# Patient Record
Sex: FEMALE | Race: WHITE | NOT HISPANIC OR LATINO | URBAN - METROPOLITAN AREA
[De-identification: names, ages, dates, MRNs, and addresses within clinical notes are randomized per-mention and may not be internally consistent; named-entity substitution may affect disease eponyms.]

---

## 2018-03-27 NOTE — PATIENT DISCUSSION
Monitor for now. Once cataract surgery is performed, will re-evaluate floater in left eye. follow with TPB.

## 2018-10-11 NOTE — PROCEDURE NOTE: SURGICAL
<p>Prior to commencing surgery patient identification, surgical procedure, site, and side were confirmed by Dr. Monserrat Lance. Following topical proparacaine anesthesia, the patient was positioned at the YAG laser, a contact lens coupled to the cornea with methylcellulose and an axial posterior capsulotomy performed without complication using 3.4 Mj x 25. Excess methylcellulose was washed from the eye, one drop of Alphagan was instilled and the patient returned to the holding area having tolerated the procedure well and without complication. </p><p>MRN:[id number]</p>

## 2018-10-22 NOTE — PATIENT DISCUSSION
Pt interested in vitreolysis regarding her visually significant vitreal opacities OS. Discuss with [facility] and retina.

## 2018-12-04 NOTE — PATIENT DISCUSSION
Telephone Encounter by Abdirizakefrain Garcia Zen VIEO at 07/06/18 10:24 AM     Author:  Rose Mary Garcia CMA Service:  (none) Author Type:  Certified Medical Assistant     Filed:  07/06/18 10:27 AM Encounter Date:  7/5/2018 Status:  Signed     :  Rose Mary Garcia Zen 9GAG Chuy (Certified Medical Assistant)            Med ordered previously by [HS1.1T] Neo[HS1.1M], will first route request to[HS1.1T] Pulmonary[HS1.1M] . Please let us know if order needed from PCP. Thanks. [HS1.1T]            Revision History        User Key Date/Time User Provider Type Action    > HS1.1 07/06/18 10:27 AM Rose Mary Garcia Zen VIEO Certified Medical Assistant Sign    M - Manual, T - Template The types of intraocular lenses were reviewed with the patient along with a discussion of their various strengths and weaknesses.

## 2019-06-06 NOTE — PATIENT DISCUSSION
Pt interested in vitreolysis. No adenopathy or splenomegaly. No cervical or inguinal lymphadenopathy.

## 2019-10-07 NOTE — PATIENT DISCUSSION
Reassurance given.  Inflammation not infection.  May persist for a long time.  Recommend frequent warm compresses and gtts.  If persists when returns from travels, she can return for other intervention.  Maxitrol QID OS x 2-3 weeks then stop.  (Tobradex and Lotemax $$)  Follow up prn.

## 2019-10-07 NOTE — PATIENT DISCUSSION
Pt discharged home with mother and grandmother. AVS gone over with pt and mother. Mother understands f/u needs. Will  rx at home pharmacy. All belongings sent with pt. IV discontinued.   consult [facility].

## 2021-02-03 NOTE — PATIENT DISCUSSION
Stable. Cimetidine Counseling:  I discussed with the patient the risks of Cimetidine including but not limited to gynecomastia, headache, diarrhea, nausea, drowsiness, arrhythmias, pancreatitis, skin rashes, psychosis, bone marrow suppression and kidney toxicity.

## 2022-01-04 ENCOUNTER — CONSULTATION/EVALUATION (OUTPATIENT)
Dept: URBAN - METROPOLITAN AREA CLINIC 14 | Facility: CLINIC | Age: 37
End: 2022-01-04

## 2022-01-04 DIAGNOSIS — H52.13: ICD-10-CM

## 2022-01-04 PROCEDURE — 99499LK REFRACTIVE CONSULT/LASIK

## 2022-01-04 ASSESSMENT — TONOMETRY
OS_IOP_MMHG: 13
OD_IOP_MMHG: 14

## 2022-01-04 ASSESSMENT — KERATOMETRY
OS_K1POWER_DIOPTERS: 46.25
OD_AXISANGLE_DEGREES: 1
OD_K1POWER_DIOPTERS: 45.50
OS_AXISANGLE2_DEGREES: 113
OS_K2POWER_DIOPTERS: 46.50
OS_AXISANGLE_DEGREES: 23
OD_K2POWER_DIOPTERS: 46.50
OD_AXISANGLE2_DEGREES: 91

## 2022-01-04 ASSESSMENT — PACHYMETRY
OS_CT_UM: 582.0
OD_CT_UM: 574.0

## 2022-01-04 ASSESSMENT — VISUAL ACUITY
OD_SC: 20/100
OS_SC: 20/400

## 2022-01-13 ENCOUNTER — CLINIC PROCEDURE ONLY (OUTPATIENT)
Dept: URBAN - METROPOLITAN AREA CLINIC 14 | Facility: CLINIC | Age: 37
End: 2022-01-13

## 2022-01-13 DIAGNOSIS — H52.7: ICD-10-CM

## 2022-01-13 DIAGNOSIS — H52.221: ICD-10-CM

## 2022-01-13 DIAGNOSIS — H52.13: ICD-10-CM

## 2022-01-13 PROCEDURE — 66999L6KDS

## 2022-01-13 PROCEDURE — 99199VFL VISION FOR LIFE

## 2022-01-13 ASSESSMENT — KERATOMETRY
OD_AXISANGLE2_DEGREES: 91
OD_AXISANGLE_DEGREES: 1
OS_AXISANGLE_DEGREES: 23
OS_AXISANGLE2_DEGREES: 113
OD_AXISANGLE_DEGREES: 1
OS_AXISANGLE_DEGREES: 23
OS_K1POWER_DIOPTERS: 46.25
OD_AXISANGLE2_DEGREES: 91
OD_K2POWER_DIOPTERS: 46.50
OD_K1POWER_DIOPTERS: 45.50
OD_K1POWER_DIOPTERS: 45.50
OD_K2POWER_DIOPTERS: 46.50
OS_K2POWER_DIOPTERS: 46.50
OS_K2POWER_DIOPTERS: 46.50
OS_AXISANGLE2_DEGREES: 113
OS_K1POWER_DIOPTERS: 46.25

## 2022-01-13 ASSESSMENT — VISUAL ACUITY
OS_BCVA: 20/20
OD_BCVA: 20/20

## 2022-01-14 ENCOUNTER — POST-OP (OUTPATIENT)
Dept: URBAN - METROPOLITAN AREA CLINIC 14 | Facility: CLINIC | Age: 37
End: 2022-01-14

## 2022-01-14 DIAGNOSIS — Z98.890: ICD-10-CM

## 2022-01-14 PROCEDURE — 99024LK POST OP LASIK CARE ONLY

## 2022-01-14 ASSESSMENT — VISUAL ACUITY
OU_SC: 20/20
OS_SC: 20/20
OD_SC: 20/20

## 2022-01-21 ENCOUNTER — POST-OP (OUTPATIENT)
Dept: URBAN - METROPOLITAN AREA CLINIC 14 | Facility: CLINIC | Age: 37
End: 2022-01-21

## 2022-01-21 DIAGNOSIS — Z98.890: ICD-10-CM

## 2022-01-21 PROCEDURE — 6699955 NON-COMANAGED LASIK PO

## 2022-01-21 ASSESSMENT — VISUAL ACUITY
OD_SC: 20/20
OS_SC: 20/20
OU_SC: 20/20

## 2022-03-29 ENCOUNTER — POST-OP (OUTPATIENT)
Dept: URBAN - METROPOLITAN AREA CLINIC 14 | Facility: CLINIC | Age: 37
End: 2022-03-29

## 2022-03-29 DIAGNOSIS — Z98.890: ICD-10-CM

## 2022-03-29 PROCEDURE — 99024LK POST OP LASIK CARE ONLY

## 2022-03-29 ASSESSMENT — VISUAL ACUITY
OU_SC: 20/15
OS_SC: 20/20
OD_SC: 20/20

## 2022-06-23 RX ORDER — TRIAMCINOLONE ACETONIDE 1 MG/G
CREAM TOPICAL
COMMUNITY
Start: 2017-08-28

## 2022-06-23 RX ORDER — ESZOPICLONE 1 MG/1
TABLET, FILM COATED ORAL
COMMUNITY

## 2022-06-23 RX ORDER — BUPROPION HYDROCHLORIDE 300 MG/1
TABLET ORAL
COMMUNITY

## 2022-06-23 RX ORDER — FLUTICASONE PROPIONATE 50 MCG
SPRAY, SUSPENSION (ML) NASAL
COMMUNITY
Start: 2018-03-24

## 2022-06-23 RX ORDER — SERTRALINE HYDROCHLORIDE 100 MG/1
TABLET, FILM COATED ORAL
COMMUNITY

## 2022-06-23 RX ORDER — CLONAZEPAM 1 MG/1
TABLET ORAL
COMMUNITY

## 2022-06-23 RX ORDER — OMEPRAZOLE 20 MG/1
CAPSULE, DELAYED RELEASE ORAL
COMMUNITY

## 2022-06-23 RX ORDER — NORETHINDRONE ACETATE AND ETHINYL ESTRADIOL 1MG-20(21)
KIT ORAL
COMMUNITY

## 2022-06-23 RX ORDER — OSELTAMIVIR PHOSPHATE 75 MG/1
1 CAPSULE ORAL
COMMUNITY
Start: 2018-03-24

## 2022-09-01 NOTE — PATIENT DISCUSSION
Discussed the risks/benefits of laser capsulotomy. Laser recommended. Patient elects to proceed.
Follow.
Good postoperative appearance.
Monitor for now. Once cataract surgery is performed, will re-evaluate floater in left eye. follow with TPB.
Monitor post Yag, OS for possible LVC.
Monitor.
No retinal detachment or retinal tear noted.
RTC ASAP if worsens or recuurent.
Recommended observation.
Stable.
We reviewed the signs and symptoms of retinal tear/retinal detachment and the importance of prompt evaluation should there be increasing floaters, new flashing lights, or decreasing peripheral vision in either eye at any time.
0